# Patient Record
Sex: MALE | Race: WHITE
[De-identification: names, ages, dates, MRNs, and addresses within clinical notes are randomized per-mention and may not be internally consistent; named-entity substitution may affect disease eponyms.]

---

## 2019-03-20 ENCOUNTER — HOSPITAL ENCOUNTER (EMERGENCY)
Dept: HOSPITAL 25 - ED | Age: 24
Discharge: HOME | End: 2019-03-20
Payer: COMMERCIAL

## 2019-03-20 VITALS — SYSTOLIC BLOOD PRESSURE: 134 MMHG | DIASTOLIC BLOOD PRESSURE: 82 MMHG

## 2019-03-20 DIAGNOSIS — F32.9: ICD-10-CM

## 2019-03-20 DIAGNOSIS — B27.10: Primary | ICD-10-CM

## 2019-03-20 LAB
ALBUMIN SERPL BCG-MCNC: 4.3 G/DL (ref 3.2–5.2)
ALBUMIN/GLOB SERPL: 1.4 {RATIO} (ref 1–3)
ALP SERPL-CCNC: 142 U/L (ref 34–104)
ALT SERPL W P-5'-P-CCNC: 403 U/L (ref 7–52)
ANION GAP SERPL CALC-SCNC: 9 MMOL/L (ref 2–11)
AST SERPL-CCNC: 214 U/L (ref 13–39)
BASOPHILS # BLD AUTO: 0.1 10^3/UL (ref 0–0.2)
BUN SERPL-MCNC: 14 MG/DL (ref 6–24)
BUN/CREAT SERPL: 12.3 (ref 8–20)
CALCIUM SERPL-MCNC: 9.2 MG/DL (ref 8.6–10.3)
CHLORIDE SERPL-SCNC: 102 MMOL/L (ref 101–111)
EOSINOPHIL # BLD AUTO: 0 10^3/UL (ref 0–0.6)
GLOBULIN SER CALC-MCNC: 3.1 G/DL (ref 2–4)
GLUCOSE SERPL-MCNC: 83 MG/DL (ref 70–100)
HCO3 SERPL-SCNC: 27 MMOL/L (ref 22–32)
HCT VFR BLD AUTO: 46 % (ref 36–46)
HGB BLD-MCNC: 15.3 G/DL (ref 14–18)
LYMPHOCYTES # BLD AUTO: 13.6 10^3/UL (ref 1–4.8)
MCH RBC QN AUTO: 28 PG (ref 27–31)
MCHC RBC AUTO-ENTMCNC: 33 G/DL (ref 31–36)
MCV RBC AUTO: 84 FL (ref 80–94)
MONOCYTES # BLD AUTO: 1.7 10^3/UL (ref 0–0.8)
NEUTROPHILS # BLD AUTO: 3.4 10^3/UL (ref 1.5–7.7)
NEUTROPHILS # BLD: 3 10^3/UL (ref 1.5–7.7)
PLATELET # BLD AUTO: 193 10^3/UL (ref 150–450)
POTASSIUM SERPL-SCNC: 4.6 MMOL/L (ref 3.5–5)
PROT SERPL-MCNC: 7.4 G/DL (ref 6.4–8.9)
RBC # BLD AUTO: 5.47 10^6 /UL (ref 4.18–5.48)
SODIUM SERPL-SCNC: 138 MMOL/L (ref 135–145)
VARIANT LYMPHS # BLD MANUAL: 42 % (ref 0–6)
WBC # BLD AUTO: 18.9 10^3/UL (ref 3.5–10.8)

## 2019-03-20 PROCEDURE — 96361 HYDRATE IV INFUSION ADD-ON: CPT

## 2019-03-20 PROCEDURE — 99283 EMERGENCY DEPT VISIT LOW MDM: CPT

## 2019-03-20 PROCEDURE — 85060 BLOOD SMEAR INTERPRETATION: CPT

## 2019-03-20 PROCEDURE — 36415 COLL VENOUS BLD VENIPUNCTURE: CPT

## 2019-03-20 PROCEDURE — 86308 HETEROPHILE ANTIBODY SCREEN: CPT

## 2019-03-20 PROCEDURE — 85025 COMPLETE CBC W/AUTO DIFF WBC: CPT

## 2019-03-20 PROCEDURE — 80053 COMPREHEN METABOLIC PANEL: CPT

## 2019-03-20 PROCEDURE — 96374 THER/PROPH/DIAG INJ IV PUSH: CPT

## 2019-03-20 PROCEDURE — 86140 C-REACTIVE PROTEIN: CPT

## 2019-03-20 PROCEDURE — 96375 TX/PRO/DX INJ NEW DRUG ADDON: CPT

## 2019-03-20 NOTE — PN
Hospitalist Progress Note


Date of Service: 03/20/19





24 yo male with PMHx depression presents to the ED with sore throat x 6 days as 

directed by PCP. Patient's sore throat has worsened to the point of having 

severe pain with swallowing food and drinks. He attempted to have soup last 

night, which he was able to swallow but with much difficulty. His mother 

noticed his speech was becoming muffled yesterday, which worsened today. He saw 

his PCP in office and was directed to the ED. He endorses fatigue and fever. 

Denies vomiting, dyspnea, and chest pain.





ED Course: 


VS on admission to ED


T 103.8 F


P 121 bpm


RR 16 r/min


O2 96% on RA


/67





In the ED, patient was given toradol and IV decadron. Patient's mother was 

uncomfortable with patient going home unless he was able to swallow a pill. 

Temperature ultimately improved to 97.0 F and pulse to 87 bpm. ED staff 

administered viscous lidocaine and the patient was able to swallow an ultram 

tab. 


Physical exam:


General: young male comfortably sitting upright in hospital stretcher, 

appearing in NAD


Eyes: PERRL and sclerae anicteric


ENT: tonsils +2 bilaterally, with white exudates bilaterally; uvula midline; 

mild erythema to pharynx; no drooling, though speech is minimally muffled


Neck: supple; anterior cervical lymphadenopathy 


Cardio: pulse regular rate and rhythm


Resp: 16 resp/min; no stridor or otherwise respiratory distress


Abd: abdomen nondistended 


Neuro: alert and oriented x 3; +5/5 strength in all extremities





Pertinent labs:





WBC 18.9, hgb 15.3, hct 46, plt 193, Na 128, K 4.6, Cl 102, CO2 27, BUN 14, Cr 

1.14, glucose 83


, 


Monoscreen: positive





Assessment/plan:





22yo male with PMHx depression presents to ED with sore throat x 6 days.





1. Mononucleosis: Patient was without stridor, his speech was minimally muffled

, and he was not drooling. His sore throat and fever of 103.6 F in the setting 

of viral infection do not qualify for admission criteria. Discussed with 

patient and his mother that hospitalization comes with risks of hospital-

acquired infections. Ultimately the patient was able to swallow a pill, which 

allows him to treat his symptoms at home. Thus the patient was comfortable with 

discharge to home. 





My attending physician, Dr. Wendy Jay, reviewed this case and is in agreement 

with this plan.

## 2019-03-21 ENCOUNTER — HOSPITAL ENCOUNTER (EMERGENCY)
Dept: HOSPITAL 25 - ED | Age: 24
LOS: 1 days | Discharge: HOME | End: 2019-03-22
Payer: COMMERCIAL

## 2019-03-21 DIAGNOSIS — B27.90: Primary | ICD-10-CM

## 2019-03-21 DIAGNOSIS — J02.9: ICD-10-CM

## 2019-03-21 PROCEDURE — 96375 TX/PRO/DX INJ NEW DRUG ADDON: CPT

## 2019-03-21 PROCEDURE — 87651 STREP A DNA AMP PROBE: CPT

## 2019-03-21 PROCEDURE — 99283 EMERGENCY DEPT VISIT LOW MDM: CPT

## 2019-03-21 PROCEDURE — 86140 C-REACTIVE PROTEIN: CPT

## 2019-03-21 PROCEDURE — 80053 COMPREHEN METABOLIC PANEL: CPT

## 2019-03-21 PROCEDURE — 96374 THER/PROPH/DIAG INJ IV PUSH: CPT

## 2019-03-21 PROCEDURE — 83735 ASSAY OF MAGNESIUM: CPT

## 2019-03-21 PROCEDURE — 96361 HYDRATE IV INFUSION ADD-ON: CPT

## 2019-03-21 PROCEDURE — 36415 COLL VENOUS BLD VENIPUNCTURE: CPT

## 2019-03-21 PROCEDURE — 85025 COMPLETE CBC W/AUTO DIFF WBC: CPT

## 2019-03-22 VITALS — DIASTOLIC BLOOD PRESSURE: 71 MMHG | SYSTOLIC BLOOD PRESSURE: 127 MMHG

## 2019-03-22 LAB
ALBUMIN SERPL BCG-MCNC: 4.3 G/DL (ref 3.2–5.2)
ALBUMIN/GLOB SERPL: 1.4 {RATIO} (ref 1–3)
ALP SERPL-CCNC: 118 U/L (ref 34–104)
ALT SERPL W P-5'-P-CCNC: 356 U/L (ref 7–52)
ANION GAP SERPL CALC-SCNC: 7 MMOL/L (ref 2–11)
AST SERPL-CCNC: 160 U/L (ref 13–39)
BASOPHILS # BLD AUTO: 0 10^3/UL (ref 0–0.2)
BUN SERPL-MCNC: 18 MG/DL (ref 6–24)
BUN/CREAT SERPL: 13.7 (ref 8–20)
CALCIUM SERPL-MCNC: 9.1 MG/DL (ref 8.6–10.3)
CHLORIDE SERPL-SCNC: 105 MMOL/L (ref 101–111)
EOSINOPHIL # BLD AUTO: 0 10^3/UL (ref 0–0.6)
GLOBULIN SER CALC-MCNC: 3.1 G/DL (ref 2–4)
GLUCOSE SERPL-MCNC: 101 MG/DL (ref 70–100)
HCO3 SERPL-SCNC: 27 MMOL/L (ref 22–32)
HCT VFR BLD AUTO: 45 % (ref 36–46)
HGB BLD-MCNC: 14.8 G/DL (ref 14–18)
LYMPHOCYTES # BLD AUTO: 11.2 10^3/UL (ref 1–4.8)
MAGNESIUM SERPL-MCNC: 2 MG/DL (ref 1.9–2.7)
MCH RBC QN AUTO: 28 PG (ref 27–31)
MCHC RBC AUTO-ENTMCNC: 33 G/DL (ref 31–36)
MCV RBC AUTO: 84 FL (ref 80–94)
MONOCYTES # BLD AUTO: 1.3 10^3/UL (ref 0–0.8)
NEUTROPHILS # BLD AUTO: 3.4 10^3/UL (ref 1.5–7.7)
NRBC # BLD AUTO: 0 10^3/UL
NRBC BLD QL AUTO: 0.2
PLATELET # BLD AUTO: 222 10^3/UL (ref 150–450)
POTASSIUM SERPL-SCNC: 4.2 MMOL/L (ref 3.5–5)
PROT SERPL-MCNC: 7.4 G/DL (ref 6.4–8.9)
RBC # BLD AUTO: 5.36 10^6 /UL (ref 4.18–5.48)
SODIUM SERPL-SCNC: 139 MMOL/L (ref 135–145)
WBC # BLD AUTO: 15.9 10^3/UL (ref 3.5–10.8)

## 2019-03-22 NOTE — ED
Throat Pain/Nasal Congestion





- HPI Summary


HPI Summary: 


This patient is a 23 year old M presenting to [facility] with a chief complaint of 

throat pain since 03/20/19, worsening today. The patient rates the pain 10/10 

in severity. Patient reports worsening pain, swelled up throat, no PO.  He was 

seen here on 03/20/19 and dx with mono. He was given Tramadol and recommended 

to take ibuprofen and magic mouthwash. Nothing alleviated his symptoms. Patient 

was also dx with the flu 2 weeks ago. 





- History of Current Complaint


Chief Complaint: EDGeneral


Time Seen by Provider: 03/22/19 01:39


Hx Obtained From: Patient, Family/Caretaker - Mother


Onset/Duration: Lasting Days - Since 03/20/19, Still Present, Worse Since - 

Today


Severity: Severe





- Allergies/Home Medications


Allergies/Adverse Reactions: 


 Allergies











Allergy/AdvReac Type Severity Reaction Status Date / Time


 


No Known Allergies Allergy   Verified 03/21/19 23:24














PMH/Surg Hx/FS Hx/Imm Hx


Previously Healthy: No - Dx with mono 03/20/19


Endocrine/Hematology History: 


   Denies: Hx Diabetes, Hx Thyroid Disease


Cardiovascular History: 


   Denies: Hx Hypertension


Respiratory History: 


   Denies: Hx Asthma, Hx Chronic Obstructive Pulmonary Disease (COPD)


GI History: 


   Denies: Hx Ulcer





- Surgical History


Surgery Procedure, Year, and Place: None


Infectious Disease History: No


Infectious Disease History: 


   Denies: Hx Hepatitis, Hx Human Immunodeficiency Virus (HIV), Traveled 

Outside the [place] in Last 30 Days





- Family History


Known Family History: Positive: None, Non-Contributory





- Social History


Alcohol Use: Occasionally


Substance Use Type: Reports: None


Smoking Status (MU): Never Smoked Tobacco


Have You Smoked in the Last Year: No





Review of Systems


Positive: Sore Throat - Throat pain, Other - Swollen throat


Positive: Other - No PO


All Other Systems Reviewed And Are Negative: Yes





Physical Exam





- Summary


Physical Exam Summary: 


VITAL SIGNS: Reviewed.


GENERAL: Patient is a well-developed and nourished MALE who is lying 

comfortable in the stretcher. Patient is not in any acute respiratory distress.


HEAD AND FACE: No signs of trauma. No ecchymosis, hematomas or skull 

depressions. No sinus tenderness.


EYES: PERRLA, EOMI x 2, No injected conjunctiva, no nystagmus.


EARS: Hearing grossly intact. Ear canals and tympanic membranes are within 

normal limits.


MOUTH: Bilateral enlarged tonsils with exudates. Not kissing.


NECK: Supple, trachea is midline, no adenopathy, no JVD, no carotid bruit, no c-

spine tenderness, neck with full ROM.


CHEST: Symmetric, no tenderness at palpation


LUNGS: Clear to auscultation bilaterally. No wheezing or crackles.


CVS: Regular rate and rhythm, S1 and S2 present, no murmurs or gallops 

appreciated.


ABDOMEN: Soft, non-tender. No signs of distention. No rebound no guarding, and 

no masses palpated. Bowel sounds are normal.


EXTREMITIES: FROM in all major joints, no edema, no cyanosis or clubbing.


NEURO: Alert and oriented x 3. No acute neurological deficits. Speech is normal 

and follows commands.


SKIN: Dry and warm


Triage Information Reviewed: Yes


Vital Signs On Initial Exam: 


 Initial Vitals











Temp Pulse Resp BP Pulse Ox


 


 99.3 F   82   16   127/79   98 


 


 03/21/19 23:15  03/21/19 23:15  03/21/19 23:15  03/21/19 23:15  03/21/19 23:15











Vital Signs Reviewed: Yes





Diagnostics





- Vital Signs


 Vital Signs











  Temp Pulse Resp BP Pulse Ox


 


 03/22/19 01:42   65    98


 


 03/22/19 01:41   65   140/74  97


 


 03/21/19 23:15  99.3 F  82  16  127/79  98














- Laboratory


Lab Results: 


 Lab Results











  03/22/19 03/22/19 03/22/19 Range/Units





  01:35 01:35 02:09 


 


WBC  15.9 H    (3.5-10.8)  10^3/uL


 


RBC  5.36    (4.18-5.48)  10^6 /uL


 


Hgb  14.8    (14.0-18.0)  g/dL


 


Hct  45    (36-46)  %


 


MCV  84    (80-94)  fL


 


MCH  28    (27-31)  pg


 


MCHC  33    (31-36)  g/dL


 


RDW  14    (10.5-15)  %


 


Plt Count  222    (150-450)  10^3/uL


 


MPV  7.5    (7.4-10.4)  fL


 


Neut % (Auto)  Pending    


 


Lymph % (Auto)  Pending    


 


Mono % (Auto)  Pending    


 


Eos % (Auto)  Pending    


 


Baso % (Auto)  Pending    


 


Absolute Neuts (auto)  Pending    


 


Absolute Lymphs (auto)  Pending    


 


Absolute Monos (auto)  Pending    


 


Absolute Eos (auto)  Pending    


 


Absolute Basos (auto)  Pending    


 


Absolute Nucleated RBC  Pending    


 


Nucleated RBC %  Pending    


 


Sodium   139   (135-145)  mmol/L


 


Potassium   4.2   (3.5-5.0)  mmol/L


 


Chloride   105   (101-111)  mmol/L


 


Carbon Dioxide   27   (22-32)  mmol/L


 


Anion Gap   7   (2-11)  mmol/L


 


BUN   18   (6-24)  mg/dL


 


Creatinine   1.31 H   (0.67-1.17)  mg/dL


 


Est GFR ( Amer)   83.6   (>60)  


 


Est GFR (Non-Af Amer)   69.1   (>60)  


 


BUN/Creatinine Ratio   13.7   (8-20)  


 


Glucose   101 H   ()  mg/dL


 


Calcium   9.1   (8.6-10.3)  mg/dL


 


Magnesium   2.0   (1.9-2.7)  mg/dL


 


Total Bilirubin   0.70   (0.2-1.0)  mg/dL


 


AST   160 H   (13-39)  U/L


 


ALT   356 H   (7-52)  U/L


 


Alkaline Phosphatase   118 H   ()  U/L


 


C-Reactive Protein   28.03 H   (<8.01)  mg/L


 


Total Protein   7.4   (6.4-8.9)  g/dL


 


Albumin   4.3   (3.2-5.2)  g/dL


 


Globulin   3.1   (2-4)  g/dL


 


Albumin/Globulin Ratio   1.4   (1-3)  


 


Group A Strep Rapid    Negative  (Negative)  











Result Diagrams: 


 03/22/19 01:35





 03/22/19 01:35


Lab Statement: Any lab studies that have been ordered have been reviewed, and 

results considered in the medical decision making process.





Re-Evaluation





- Re-Evaluation


  ** 1


Re-Evaluation Time: 04:10


Change: Improved


Comment: Pt feels better. He was able to talk better now. Able to drink. His 

sore throat is less. Pt will be d/c home and was given prescription for liquid 

steroids.





EENT Course/Dx





- Course


Course Of Treatment: This patient is a 23 year old M presenting to [facility] with a 

chief complaint of throat pain since 03/20/19, worsening today.  On re-eval: Pt 

feels better. He was able to talk better now. Able to drink. His sore throat is 

less. Pt will be d/c home and was given prescription for liquid steroids.





- Diagnoses


Provider Diagnoses: 


 Infectious mononucleosis, Sore throat








Discharge





- Sign-Out/Discharge


Documenting (check all that apply): Patient Departure - D/C home


Patient Received Moderate/Deep Sedation with Procedure: No





- Discharge Plan


Condition: Stable


Disposition: HOME


Prescriptions: 


PredNISOLone LIQ 5MG/ML* 60 mg PO DAILY #60 ml


Patient Education Materials:  Mononucleosis (ED)


Referrals: 


Pritesh Akhtar DO [Primary Care Provider] - 2 Days


Additional Instructions: 


PLEASE RETURN TO THE ED TO IMMEDIATELY FOR WORSENING OR CONCERNING SYMPTOMS.





- Billing Disposition and Condition


Condition: STABLE


Disposition: Home





- Attestation Statements


Document Initiated by Cayden: Yes


Documenting Scribe: Diaz Mendoza


Provider For Whom Cayden is Documenting (Include Credential): Rebecca Robledo MD


Scribe Attestation: 


Diaz YE, scribed for Rebecca Robledo MD on 03/22/19 at 0600. 


Scribe Documentation Reviewed: Yes


Provider Attestation: 


The documentation as recorded by the Diaz mishra accurately reflects the 

service I personally performed and the decisions made by me, Rebecca Robledo MD


Status of Scribe Document: Viewed